# Patient Record
Sex: FEMALE | Race: WHITE | NOT HISPANIC OR LATINO | Employment: UNEMPLOYED | URBAN - METROPOLITAN AREA
[De-identification: names, ages, dates, MRNs, and addresses within clinical notes are randomized per-mention and may not be internally consistent; named-entity substitution may affect disease eponyms.]

---

## 2017-12-07 ENCOUNTER — OFFICE VISIT (OUTPATIENT)
Dept: ORTHOPEDIC SURGERY | Facility: CLINIC | Age: 47
End: 2017-12-07

## 2017-12-07 VITALS
HEIGHT: 65 IN | BODY MASS INDEX: 20.94 KG/M2 | HEART RATE: 72 BPM | DIASTOLIC BLOOD PRESSURE: 75 MMHG | WEIGHT: 125.66 LBS | SYSTOLIC BLOOD PRESSURE: 107 MMHG

## 2017-12-07 DIAGNOSIS — M79.645 THUMB PAIN, LEFT: Primary | ICD-10-CM

## 2017-12-07 PROCEDURE — 99204 OFFICE O/P NEW MOD 45 MIN: CPT | Performed by: PHYSICIAN ASSISTANT

## 2017-12-07 PROCEDURE — 20600 DRAIN/INJ JOINT/BURSA W/O US: CPT | Performed by: PHYSICIAN ASSISTANT

## 2017-12-07 RX ORDER — LIDOCAINE HYDROCHLORIDE 10 MG/ML
1 INJECTION, SOLUTION INFILTRATION; PERINEURAL
Status: COMPLETED | OUTPATIENT
Start: 2017-12-07 | End: 2017-12-07

## 2017-12-07 RX ORDER — TRIAMCINOLONE ACETONIDE 40 MG/ML
20 INJECTION, SUSPENSION INTRA-ARTICULAR; INTRAMUSCULAR
Status: COMPLETED | OUTPATIENT
Start: 2017-12-07 | End: 2017-12-07

## 2017-12-07 RX ADMIN — LIDOCAINE HYDROCHLORIDE 1 ML: 10 INJECTION, SOLUTION INFILTRATION; PERINEURAL at 13:41

## 2017-12-07 RX ADMIN — TRIAMCINOLONE ACETONIDE 20 MG: 40 INJECTION, SUSPENSION INTRA-ARTICULAR; INTRAMUSCULAR at 13:41

## 2017-12-07 NOTE — PROGRESS NOTES
Procedure   Small Joint Arthrocentesis  Consent given by: patient  Site marked: site marked  Timeout: Immediately prior to procedure a time out was called to verify the correct patient, procedure, equipment, support staff and site/side marked as required   Supporting Documentation  Indications: pain   Procedure Details  Location: thumb - L thumb CMC  Preparation: Patient was prepped and draped in the usual sterile fashion  Needle size: 25 G  Approach: dorsal  Medications administered: 20 mg triamcinolone acetonide 40 MG/ML; 1 mL lidocaine 1 %  Patient tolerance: patient tolerated the procedure well with no immediate complications

## 2017-12-07 NOTE — PROGRESS NOTES
"        [facility] Note    Subjective     Patient: Aisha Moran  : 1970    Primary Care Provider: Comfort Shaver MD    Requesting Provider: As above    Pain of the Left Hand      History    Chief Complaint: left thumb pain and thumb, long and ring finger pain.    History of Present Illness: This is a very pleasant RHD 47-year-old female presenting today discuss her two-month history of left thumb pain and long and ring finger tingling and pain.  She denies any trauma or injury.  She complains of pain with any use of the hand.  She rates it to be 10/10.  She has difficulty describing the pain and describes it as \"bad pain\".  She has pain with any activity, worse in the evenings after she is use the hand.  She has been using a splint during the day and taking Tylenol with improved pain.  She denies any tip triggering or mechanical symptoms.  She reports radiating pain into the thumb.    Current Outpatient Prescriptions on File Prior to Visit   Medication Sig Dispense Refill   • albuterol (PROVENTIL HFA;VENTOLIN HFA) 108 (90 BASE) MCG/ACT inhaler Inhale 2 puffs every 4 (four) hours as needed for wheezing or shortness of air. 1 inhaler 0   • clonazePAM (KlonoPIN) 1 MG tablet Take 1 mg by mouth 2 (two) times a day as needed for seizures.     • melatonin 5 MG tablet tablet Take 5 mg by mouth at night as needed.     • [DISCONTINUED] predniSONE (DELTASONE) 20 MG tablet Take 1 tablet by mouth daily. 5 tablet 0     No current facility-administered medications on file prior to visit.       No Known Allergies   Past Medical History:   Diagnosis Date   • Asthma    • Heart disease    • Panic attacks      Past Surgical History:   Procedure Laterality Date   • HYSTERECTOMY      partial   • LUMBAR FUSION       Family History   Problem Relation Age of Onset   • Osteoarthritis Mother    • Diabetes Mother    • Heart attack Father       Social History     Social History   • Marital status:      Spouse " "name: N/A   • Number of children: N/A   • Years of education: N/A     Occupational History   • Not on file.     Social History Main Topics   • Smoking status: Former Smoker     Packs/day: 1.00     Years: 20.00   • Smokeless tobacco: Never Used   • Alcohol use No   • Drug use: No   • Sexual activity: Defer     Other Topics Concern   • Not on file     Social History Narrative        Review of Systems   Constitutional: Positive for activity change.   HENT: Negative.    Eyes: Negative.    Respiratory: Negative.    Cardiovascular: Negative.    Gastrointestinal: Negative.    Endocrine: Negative.    Genitourinary: Negative.    Musculoskeletal: Positive for arthralgias.   Skin: Negative.    Allergic/Immunologic: Negative.    Neurological: Negative.    Hematological: Negative.    Psychiatric/Behavioral: Negative.        The following portions of the patient's history were reviewed and updated as appropriate: allergies, current medications, past family history, past medical history, past social history, past surgical history and problem list.      Objective      Physical Exam  /75  Pulse 72  Ht 166 cm (65.35\")  Wt 57 kg (125 lb 10.6 oz)  BMI 20.69 kg/m2    Body mass index is 20.69 kg/(m^2).    GENERAL: Body habitus: normal weight for height    Gait: normal     Mental Status:  awake and alert; oriented to person, place, and time    Voice:  clear  SKIN:  Normal    Hair Growth:  Right:normal; Left:  normal  HEENT: Head: Normocephalic, atraumatic,  without obvious abnormality.  eye: normal external eye, no icterus   PULM:  Repiratory effort normal    Ortho Exam  Peripheral Vascular   Bilateral Upper Extremity    No cyanotic nail beds    Pink nail beds and rapid capillary refill   Palpation    Radial Pulse - Bilaterally normal    Neurologic   Sensory: Light touch intact- Right and left hand    Left Upper Extremity    Left wrist extensors: 5/5    Left wrist flexors: 5/5    Left intrinsics: 5/5     Musculoskeletal   Left " Elbow    Forearm supination: AROM - 90 degrees    Forearm pronation: AROM - 90 degrees     Inspection and Palpation    Left Wrist    Tenderness - mildly tender over the radial styloid    Swelling - none    Crepitus - none    Muscle tone - no atrophy     ROJM:   Left Wrist    Flexion: AROM - 90 degrees    Extension: AROM - 90 degrees      Deformities, Malalignments, Discrepancies    None     Functional Testing    Left Wrist    Tinel's Sign negative    Phalen's Sign negative    Negative Finkelstein's test       Strength and Tone    Left  strength: good     Hand Exam:     Tender over the thumb CMC joint with mildly positive grind test.  Tender over thumb A1 pulley with no palpable triggering    Full range of motion of the thumb MCPJ and IPJ bialterally    Full range of motion of the index finger MCPJ, PIPJ and DIPJ  bilaterally    Full range of motion of the middle finger MCPJ, PIPJ and DIPJ bilaterally    Full range of motion of the ring finger MCPJ, PIPJ and DIPJ bilaterally    Full range of motion of the small finger MCPJ, PIPJ and DIPJ bialterally    FDS, FDP and extensor tendons are intact in the index, middle, ring and small fingers bilaterally    FPJ and extensor tendons are intact in the thumb.    No palpable triggering      Medical Decision Making    Data Review:   ordered and reviewed x-rays today    Assessment:  1. Thumb pain, left        Plan:  1. Left thumb pain.  I reviewed today's x-rays and clinical findings, past and current treatment with the patient.  On exam, she is tender over the thumb CMC joint with mildly positive grind test,  tender over the A1 pulley..  X-rays show no significant arthritis or anything more worrisome.  I explained to the patient that sometimes thumb pain can be difficult to figure out.  I suspect her pain may be secondary to synovitis in the joint.  She does not have any palpable nodule or triggering in the thumb.  We discussed treatment options including continued use of  the brace, topical anti-inflammatories and steroid injection.  Plan today is steroid injection into the left CMC joints.  I spent her that this can take about a week to kick in.  She will continue using her brace.  We will see her back in one month and if she is not had any pain relief we'll consider injecting the tendon sheath.      Mechelle Dai PA-C  12/07/17  2:12 PM

## 2018-01-11 ENCOUNTER — OFFICE VISIT (OUTPATIENT)
Dept: ORTHOPEDIC SURGERY | Facility: CLINIC | Age: 48
End: 2018-01-11

## 2018-01-11 DIAGNOSIS — M65.312 TRIGGER THUMB OF LEFT HAND: Primary | ICD-10-CM

## 2018-01-11 PROCEDURE — 99213 OFFICE O/P EST LOW 20 MIN: CPT | Performed by: PHYSICIAN ASSISTANT

## 2018-01-11 PROCEDURE — 20550 NJX 1 TENDON SHEATH/LIGAMENT: CPT | Performed by: PHYSICIAN ASSISTANT

## 2018-01-11 RX ORDER — METHYLPREDNISOLONE ACETATE 80 MG/ML
80 INJECTION, SUSPENSION INTRA-ARTICULAR; INTRALESIONAL; INTRAMUSCULAR; SOFT TISSUE
Status: COMPLETED | OUTPATIENT
Start: 2018-01-11 | End: 2018-01-11

## 2018-01-11 RX ORDER — LIDOCAINE HYDROCHLORIDE 10 MG/ML
1 INJECTION, SOLUTION INFILTRATION; PERINEURAL
Status: COMPLETED | OUTPATIENT
Start: 2018-01-11 | End: 2018-01-11

## 2018-01-11 RX ADMIN — LIDOCAINE HYDROCHLORIDE 1 ML: 10 INJECTION, SOLUTION INFILTRATION; PERINEURAL at 13:23

## 2018-01-11 RX ADMIN — METHYLPREDNISOLONE ACETATE 80 MG: 80 INJECTION, SUSPENSION INTRA-ARTICULAR; INTRALESIONAL; INTRAMUSCULAR; SOFT TISSUE at 13:23

## 2018-01-11 NOTE — PROGRESS NOTES
Procedure   Left Trigger Thumb Injection  Consent given by: patient  Site marked: site marked  Timeout: Immediately prior to procedure a time out was called to verify the correct patient, procedure, equipment, support staff and site/side marked as required   Supporting Documentation  Indications: pain   Procedure Details  Location: thumb - Thumb joint: Left Trigger Thumb.  Preparation: Patient was prepped and draped in the usual sterile fashion  Needle size: 22 G  Medications administered: 1 mL lidocaine 1 %; 80 mg methylPREDNISolone acetate 80 MG/ML (1cc Bupivacaine  NDC#95432-511-09  Lot#[id number]  Exp. Date: 09/01/2019)  Patient tolerance: patient tolerated the procedure well with no immediate complications

## 2018-01-11 NOTE — PROGRESS NOTES
[facility]    Subjective     Patient: Aisha Moran  : 1970    Primary Care Provider: Comfort Shaver MD    Requesting Provider: As above    Follow-up of the Left Thumb (1 month follow up)      History    Chief Complaint: Follow-up left thumb pain    History of Present Illness: Patient presents for follow-up left thumb pain after left CMC joint injection.  She reports that the injection improved her pain 80+ percent.  She is now able to use the thumb in most activities without pain.  She now is complaining of catching in the thumb.  Sometimes during the day the thumb gets stuck down and she has to manually extend it with the other hand.  She denies any radiating pain.  She denies any swelling warmth or erythema.  She denies any other digit pain.  She denies any fever, chills or constitutional symptoms    Current Outpatient Prescriptions on File Prior to Visit   Medication Sig Dispense Refill   • albuterol (PROVENTIL HFA;VENTOLIN HFA) 108 (90 BASE) MCG/ACT inhaler Inhale 2 puffs every 4 (four) hours as needed for wheezing or shortness of air. 1 inhaler 0   • clonazePAM (KlonoPIN) 1 MG tablet Take 1 mg by mouth 2 (two) times a day as needed for seizures.     • melatonin 5 MG tablet tablet Take 5 mg by mouth at night as needed.       No current facility-administered medications on file prior to visit.       No Known Allergies   Past Medical History:   Diagnosis Date   • Asthma    • Heart disease    • Panic attacks      Past Surgical History:   Procedure Laterality Date   • HYSTERECTOMY      partial   • LUMBAR FUSION       Family History   Problem Relation Age of Onset   • Osteoarthritis Mother    • Diabetes Mother    • Heart attack Father       Social History     Social History   • Marital status:      Spouse name: N/A   • Number of children: N/A   • Years of education: N/A     Occupational History   • Not on file.     Social History Main Topics   • Smoking status: Former  Smoker     Packs/day: 1.00     Years: 20.00   • Smokeless tobacco: Never Used   • Alcohol use No   • Drug use: No   • Sexual activity: Defer     Other Topics Concern   • Not on file     Social History Narrative        Review of Systems    The following portions of the patient's history were reviewed and updated as appropriate: allergies, current medications, past family history, past medical history, past social history, past surgical history and problem list.      Objective      Physical Exam  There were no vitals taken for this visit.    There is no height or weight on file to calculate BMI.      Ortho Exam  Right hand exam:    Tender over the A1 pulley of the left thumb with palpable nodule    Thumb CMC joint mildly tender    Full range of motion of the thumb MCPJ and IPJ bilaterally    Full range of motion of the index finger MCPJ, PIPJ and DIPJ  bilaterally    Full range of motion of the middle finger MCPJ, PIPJ and DIPJ bilaterally    Full range of motion of the ring finger MCPJ, PIPJ and DIPJ bilaterally    Full range of motion of the small finger MCPJ, PIPJ and DIPJ bilaterally    FDS, FDP and extensor tendons are intact in the index, middle, ring and small fingers bilaterally    FPJ and extensor tendons are intact in the thumb.    No palpable triggering    Medical Decision Making    Data Review:   none    Assessment:  1. Trigger thumb of left hand        Plan:  Left trigger thumb.  I reviewed clinical findings past and current treatment with the patient.  On exam, she is tender over the left thumb A1 pulley with palpable nodule and no palpable triggering.  She reports that the CMC injection improved her pain 80% but she complains of continued catching and triggering of the thumb.  We discussed treatment options including continued splinting and injection into the tendon sheath.  I explained to her that injection can resolve the problem about 30% of the time.  If it returns and continues to affect her  ability to do her normal daily activity, she may want to consider surgical trigger finger release.  Her condition today is steroid injection in the left thumb tendon sheath.  She'll return in 6-8 weeks to see how she has progressed or sooner if needed.    Mechelle Dai PA-C  01/11/18  4:34 PM

## 2023-01-24 ENCOUNTER — APPOINTMENT (OUTPATIENT)
Dept: GENERAL RADIOLOGY | Facility: HOSPITAL | Age: 53
End: 2023-01-24
Payer: COMMERCIAL

## 2023-01-24 ENCOUNTER — APPOINTMENT (OUTPATIENT)
Dept: CT IMAGING | Facility: HOSPITAL | Age: 53
End: 2023-01-24
Payer: COMMERCIAL

## 2023-01-24 ENCOUNTER — HOSPITAL ENCOUNTER (EMERGENCY)
Facility: HOSPITAL | Age: 53
Discharge: HOME | End: 2023-01-24
Attending: EMERGENCY MEDICINE | Admitting: EMERGENCY MEDICINE
Payer: COMMERCIAL

## 2023-01-24 VITALS
BODY MASS INDEX: 17.52 KG/M2 | HEART RATE: 89 BPM | WEIGHT: 109 LBS | SYSTOLIC BLOOD PRESSURE: 100 MMHG | TEMPERATURE: 97.7 F | OXYGEN SATURATION: 99 % | HEIGHT: 66 IN | DIASTOLIC BLOOD PRESSURE: 70 MMHG | RESPIRATION RATE: 18 BRPM

## 2023-01-24 DIAGNOSIS — Z82.49 FAMILY HISTORY OF HEART ATTACK: ICD-10-CM

## 2023-01-24 DIAGNOSIS — R07.89 CHEST PAIN, ATYPICAL: Primary | ICD-10-CM

## 2023-01-24 DIAGNOSIS — E87.6 HYPOKALEMIA: ICD-10-CM

## 2023-01-24 DIAGNOSIS — R07.9 ACUTE CHEST PAIN: ICD-10-CM

## 2023-01-24 DIAGNOSIS — R19.7 DIARRHEA, UNSPECIFIED TYPE: ICD-10-CM

## 2023-01-24 DIAGNOSIS — F17.210 TOBACCO DEPENDENCE DUE TO CIGARETTES: ICD-10-CM

## 2023-01-24 LAB
ALBUMIN SERPL-MCNC: 4 G/DL (ref 3.5–5.2)
ALBUMIN/GLOB SERPL: 1.5 G/DL
ALP SERPL-CCNC: 70 U/L (ref 39–117)
ALT SERPL W P-5'-P-CCNC: 11 U/L (ref 1–33)
ANION GAP SERPL CALCULATED.3IONS-SCNC: 11 MMOL/L (ref 5–15)
AST SERPL-CCNC: 18 U/L (ref 1–32)
BASOPHILS # BLD AUTO: 0.06 10*3/MM3 (ref 0–0.2)
BASOPHILS NFR BLD AUTO: 0.7 % (ref 0–1.5)
BILIRUB SERPL-MCNC: 0.2 MG/DL (ref 0–1.2)
BUN SERPL-MCNC: 12 MG/DL (ref 6–20)
BUN/CREAT SERPL: 15 (ref 7–25)
CALCIUM SPEC-SCNC: 9.3 MG/DL (ref 8.6–10.5)
CHLORIDE SERPL-SCNC: 103 MMOL/L (ref 98–107)
CK SERPL-CCNC: 58 U/L (ref 20–180)
CO2 SERPL-SCNC: 26 MMOL/L (ref 22–29)
CREAT SERPL-MCNC: 0.8 MG/DL (ref 0.57–1)
DEPRECATED RDW RBC AUTO: 43.3 FL (ref 37–54)
EGFRCR SERPLBLD CKD-EPI 2021: 89.3 ML/MIN/1.73
EOSINOPHIL # BLD AUTO: 0.18 10*3/MM3 (ref 0–0.4)
EOSINOPHIL NFR BLD AUTO: 2 % (ref 0.3–6.2)
ERYTHROCYTE [DISTWIDTH] IN BLOOD BY AUTOMATED COUNT: 12.3 % (ref 12.3–15.4)
FLUAV RNA RESP QL NAA+PROBE: NOT DETECTED
FLUBV RNA RESP QL NAA+PROBE: NOT DETECTED
GLOBULIN UR ELPH-MCNC: 2.6 GM/DL
GLUCOSE SERPL-MCNC: 95 MG/DL (ref 65–99)
HCT VFR BLD AUTO: 44.1 % (ref 34–46.6)
HGB BLD-MCNC: 15.2 G/DL (ref 12–15.9)
HOLD SPECIMEN: NORMAL
IMM GRANULOCYTES # BLD AUTO: 0.01 10*3/MM3 (ref 0–0.05)
IMM GRANULOCYTES NFR BLD AUTO: 0.1 % (ref 0–0.5)
LYMPHOCYTES # BLD AUTO: 3.12 10*3/MM3 (ref 0.7–3.1)
LYMPHOCYTES NFR BLD AUTO: 34.8 % (ref 19.6–45.3)
MAGNESIUM SERPL-MCNC: 1.8 MG/DL (ref 1.6–2.6)
MCH RBC QN AUTO: 33 PG (ref 26.6–33)
MCHC RBC AUTO-ENTMCNC: 34.5 G/DL (ref 31.5–35.7)
MCV RBC AUTO: 95.7 FL (ref 79–97)
MONOCYTES # BLD AUTO: 0.72 10*3/MM3 (ref 0.1–0.9)
MONOCYTES NFR BLD AUTO: 8 % (ref 5–12)
NEUTROPHILS NFR BLD AUTO: 4.88 10*3/MM3 (ref 1.7–7)
NEUTROPHILS NFR BLD AUTO: 54.4 % (ref 42.7–76)
NRBC BLD AUTO-RTO: 0 /100 WBC (ref 0–0.2)
NT-PROBNP SERPL-MCNC: 33 PG/ML (ref 0–900)
PLATELET # BLD AUTO: 305 10*3/MM3 (ref 140–450)
PMV BLD AUTO: 10.3 FL (ref 6–12)
POTASSIUM SERPL-SCNC: 2.7 MMOL/L (ref 3.5–5.2)
PROT SERPL-MCNC: 6.6 G/DL (ref 6–8.5)
QT INTERVAL: 368 MS
QTC INTERVAL: 457 MS
RBC # BLD AUTO: 4.61 10*6/MM3 (ref 3.77–5.28)
SARS-COV-2 RNA RESP QL NAA+PROBE: NOT DETECTED
SODIUM SERPL-SCNC: 140 MMOL/L (ref 136–145)
TROPONIN T SERPL-MCNC: <0.01 NG/ML (ref 0–0.03)
WBC NRBC COR # BLD: 8.97 10*3/MM3 (ref 3.4–10.8)
WHOLE BLOOD HOLD COAG: NORMAL
WHOLE BLOOD HOLD SPECIMEN: NORMAL

## 2023-01-24 PROCEDURE — 71275 CT ANGIOGRAPHY CHEST: CPT

## 2023-01-24 PROCEDURE — 99284 EMERGENCY DEPT VISIT MOD MDM: CPT | Performed by: INTERNAL MEDICINE

## 2023-01-24 PROCEDURE — 83735 ASSAY OF MAGNESIUM: CPT | Performed by: NURSE PRACTITIONER

## 2023-01-24 PROCEDURE — 0 POTASSIUM CHLORIDE 10 MEQ/100ML SOLUTION: Performed by: NURSE PRACTITIONER

## 2023-01-24 PROCEDURE — 99284 EMERGENCY DEPT VISIT MOD MDM: CPT

## 2023-01-24 PROCEDURE — 71045 X-RAY EXAM CHEST 1 VIEW: CPT

## 2023-01-24 PROCEDURE — 85025 COMPLETE CBC W/AUTO DIFF WBC: CPT | Performed by: EMERGENCY MEDICINE

## 2023-01-24 PROCEDURE — 36415 COLL VENOUS BLD VENIPUNCTURE: CPT

## 2023-01-24 PROCEDURE — 93005 ELECTROCARDIOGRAM TRACING: CPT | Performed by: EMERGENCY MEDICINE

## 2023-01-24 PROCEDURE — 87636 SARSCOV2 & INF A&B AMP PRB: CPT | Performed by: NURSE PRACTITIONER

## 2023-01-24 PROCEDURE — 96365 THER/PROPH/DIAG IV INF INIT: CPT

## 2023-01-24 PROCEDURE — 0 IOPAMIDOL PER 1 ML: Performed by: EMERGENCY MEDICINE

## 2023-01-24 PROCEDURE — 80053 COMPREHEN METABOLIC PANEL: CPT | Performed by: EMERGENCY MEDICINE

## 2023-01-24 PROCEDURE — 83880 ASSAY OF NATRIURETIC PEPTIDE: CPT | Performed by: EMERGENCY MEDICINE

## 2023-01-24 PROCEDURE — 82550 ASSAY OF CK (CPK): CPT | Performed by: NURSE PRACTITIONER

## 2023-01-24 PROCEDURE — 84484 ASSAY OF TROPONIN QUANT: CPT | Performed by: EMERGENCY MEDICINE

## 2023-01-24 RX ORDER — POTASSIUM CHLORIDE 7.45 MG/ML
10 INJECTION INTRAVENOUS ONCE
Status: COMPLETED | OUTPATIENT
Start: 2023-01-24 | End: 2023-01-24

## 2023-01-24 RX ORDER — POTASSIUM CHLORIDE 750 MG/1
20 CAPSULE, EXTENDED RELEASE ORAL ONCE
Status: COMPLETED | OUTPATIENT
Start: 2023-01-24 | End: 2023-01-24

## 2023-01-24 RX ORDER — SODIUM CHLORIDE 0.9 % (FLUSH) 0.9 %
10 SYRINGE (ML) INJECTION AS NEEDED
Status: DISCONTINUED | OUTPATIENT
Start: 2023-01-24 | End: 2023-01-24 | Stop reason: HOSPADM

## 2023-01-24 RX ORDER — POTASSIUM CHLORIDE 750 MG/1
10 TABLET, FILM COATED, EXTENDED RELEASE ORAL 2 TIMES DAILY
Qty: 6 TABLET | Refills: 0 | Status: SHIPPED | OUTPATIENT
Start: 2023-01-24

## 2023-01-24 RX ADMIN — IOPAMIDOL 75 ML: 755 INJECTION, SOLUTION INTRAVENOUS at 14:25

## 2023-01-24 RX ADMIN — POTASSIUM CHLORIDE 10 MEQ: 7.46 INJECTION, SOLUTION INTRAVENOUS at 14:52

## 2023-01-24 RX ADMIN — POTASSIUM CHLORIDE 20 MEQ: 10 CAPSULE, COATED, EXTENDED RELEASE ORAL at 14:49

## 2023-01-24 NOTE — ED PROVIDER NOTES
Subjective   History of Present Illness  Pleasant patient presents to the ER for chest pain and difficulty breathing off and on for last several weeks worse recently.  She tells me it is exertional.  She tells me she is under a great deal stress and is undergoing a divorce.  She is from [facility] but is living in [place] with her brother.  She tells me her father  at a early age in his 60s of heart disease.  Patient is a smoker and she tells me she has had significant amount of weight loss over the last several months around 50 pounds unintentional.  She is unsure if it is from the stress of her divorce or something else.  She denies any abdominal pain.  Tells me she been told in the past that she has had a dysfunctional left ventricle and was told that she needed an echo.  She tells me this was advised her at  several years ago.  She denies any fever or chills.  She denies any dizziness.  She was sent here from the [facility] by EMS for further evaluation of her chest pain difficulty breathing.        Review of Systems    Past Medical History:   Diagnosis Date   • Asthma    • Heart disease    • Panic attacks        No Known Allergies    Past Surgical History:   Procedure Laterality Date   • HYSTERECTOMY      partial   • LUMBAR FUSION         Family History   Problem Relation Age of Onset   • Osteoarthritis Mother    • Diabetes Mother    • Heart attack Father        Social History     Socioeconomic History   • Marital status: Legally    Tobacco Use   • Smoking status: Former     Packs/day: 1.00     Years: 20.00     Pack years: 20.00     Types: Cigarettes   • Smokeless tobacco: Never   Vaping Use   • Vaping Use: Never used   Substance and Sexual Activity   • Alcohol use: No   • Drug use: No   • Sexual activity: Defer           Objective   Physical Exam  Constitutional:       Appearance: She is well-developed.   HENT:      Head: Normocephalic and atraumatic.      Right Ear: External ear normal.       Left Ear: External ear normal.      Nose: Nose normal.   Eyes:      Conjunctiva/sclera: Conjunctivae normal.      Pupils: Pupils are equal, round, and reactive to light.   Cardiovascular:      Rate and Rhythm: Normal rate and regular rhythm.      Heart sounds: Normal heart sounds.   Pulmonary:      Effort: Pulmonary effort is normal.      Breath sounds: Normal breath sounds.   Abdominal:      General: Bowel sounds are normal.      Palpations: Abdomen is soft.   Musculoskeletal:         General: Normal range of motion.      Cervical back: Normal range of motion and neck supple.   Skin:     General: Skin is warm and dry.   Neurological:      Mental Status: She is alert and oriented to person, place, and time.   Psychiatric:         Behavior: Behavior normal.         Thought Content: Thought content normal.         Judgment: Judgment normal.         Procedures           ED Course  ED Course as of 01/24/23 1600   Tue Jan 24, 2023   1353 Cards Consulted via Donya [DAYAMI]   1424 Broad differential including pulmonary embolism arrhythmia.  Coronary artery disease.  Her potassium is low we are replacing that.  We are awaiting cardiology evaluation and the results of the CTA.  We will also need to get a second troponin EKG for further evaluation. [JM]   1438 Awaiting CT results. [JM]   1525 I spoke with cardiology.  They plan on ordering a stress test and they are putting that in nail.  They do not think a second set is necessary.  Patient advised him that she had been having diarrhea for quite some time.  Did not appreciate any abdominal pain during my exam.  I think it is reasonable to give her a GI referral as well as stool studies.  She can also take over-the-counter potassium as well [DAYAMI]      ED Course User Index  [DAYAMI] Patel Chirinos APRN                 CT Angiogram Chest   Final Result   Impression:   No evidence of pulmonary embolic disease. No evidence of pneumonia or other active chest pathology      Electronically  Signed: Pee Rahman     1/24/2023 3:04 PM EST     Workstation ID: [id number]      XR Chest 1 View   Final Result   Impression:   No acute cardiopulmonary disease.      Electronically Signed: Brodie Espinoza     1/24/2023 12:59 PM EST     Workstation ID: [id number]                                     Medical Decision Making  Pneumonia.  Costochondritis.  Pleurisy.  Pulmonary embolism.    Acute chest pain: acute illness or injury     Details: Reproducible  Diarrhea, unspecified type: acute illness or injury  Hypokalemia: acute illness or injury     Details: Diarrhea.  Oral and IV potassium ordered.  Amount and/or Complexity of Data Reviewed  External Data Reviewed: labs, radiology, ECG and notes.  Labs: ordered. Decision-making details documented in ED Course.  Radiology: ordered. Decision-making details documented in ED Course.  ECG/medicine tests: ordered. Decision-making details documented in ED Course.  Discussion of management or test interpretation with external provider(s): Spoke with Dr. Tran in person.  They plan on ordering a stress test.  Doubt cardiac in nature    Risk  Prescription drug management.          Final diagnoses:   Acute chest pain   Diarrhea, unspecified type   Hypokalemia       ED Disposition  ED Disposition     ED Disposition   Discharge    Condition   Stable    Comment   --             Comfort Shaver MD  [address] [place] [address]  [phone]    Schedule an appointment as soon as possible for a visit       [facility] Emergency Department  [address] [address] [phone]    As needed    [facility] - [place] [id number]  [phone]  Schedule an appointment as soon as possible for a visit            Medication List      New Prescriptions    potassium chloride 10 MEQ CR tablet  Take 1 tablet by mouth 2 (Two) Times a Day.           Where to Get Your Medications      These medications were sent to [facility] - [place], [place] - [address] - [phone] PH - [phone] FX  [address], [facility] [address]    Phone: [phone]   · potassium chloride 10 MEQ CR tablet          Patel Chirinos APRN  01/24/23 1533       Patel Chirinos APRN  01/24/23 1534       Patel Chirinos APRN  01/24/23 1600

## 2023-01-24 NOTE — CASE MANAGEMENT/SOCIAL WORK
Continued Stay Note  [facility]     Patient Name: Aisha Moran  MRN: [id number]  Today's Date: 1/24/2023    Admit Date: 1/24/2023    Plan: Transport   Discharge Plan     Row Name 01/24/23 1637       Plan    Plan Transport    Plan Comments MSW contacted by RN requesting transportation assistance. MSW scheduled Lyft Ride and reported details to Pt. RN updated. MSW available.    Final Discharge Disposition Code 01 - home or self-care               Discharge Codes    No documentation.                     JOHANNA Rizzo

## 2023-01-24 NOTE — CONSULTS
"[facility] at [facility]  CARDIOLOGY CONSULTATION NOTE    Aisha Moran  : 1970  MRN:[id number]  Home Phone:[phone]    Date of Admission:2023  Date of Consultation: 23   Primary Provider:  Comfort Shvaer MD    Referring Provider: No ref. provider found  Reason for Consultation: chest pain    IDENTIFICATION: A 53 y.o. female from [place], [place] but living with her brother in [place]. Going through divorce    CC:   Chief Complaint   Patient presents with   • Shortness of Breath       PROBLEM LIST:   1. Atypical chest pain  1. Regadenoson Stress 2016 at : no evidence of ischemia, mildly depressed resting global function with hypokinesis of the mid to distal anterior and lateral myocardium, regions of hypokinesis at est significantly improved with stress, no CMR evidence of LV scar, normal RV size and fx  2. Asthma  3. Anxiety/panic attacks  4. Hypokalemia, likely due to chronic diarrhea    ALLERGIES: No Known Allergies    HPI: Ms. Moran is a 53-year-old female with minimal cardiac history who presented to [facility] ED with chest pain.  In 2016 she had an MR cardiac stress with and without IV contrast performed at  with slightly abnormal results reported above including several hypokinetic LV wall segments at rest that improved with stress with no signs of LV scar.  She has been having sharp central chest pain intermittently for about 10 days that sometimes \"takes her breath away,\" but otherwise has no associated symptoms.  It does not radiate.  Patient states that he does not feel exertional, but that she thinks it is related to stress.  She is currently in the middle of a divorce.  She reports having chronic diarrhea for at least 2 months with 3+ loose stools a day. She has had unintentional weight loss of 50 pounds over the last 8 months that she attributes to decreased p.o. intake due to stress from her divorce as well as frequent diarrhea.  She denies palpitations, " dizziness, lower extremity swelling or syncope.  Initial troponin was negative.  EKG with sinus mechanism and no signs of acute ischemia.  Chest x-ray and CTA chest with no acute cardiopulmonary abnormality.      ROS: All systems have been reviewed and are negative with the exception of those mentioned in the HPI and problem list above.    HOME MEDICINES:   Current Outpatient Medications   Medication Instructions   • albuterol (PROVENTIL HFA;VENTOLIN HFA) 108 (90 BASE) MCG/ACT inhaler 2 puffs, Inhalation, Every 4 Hours PRN   • buPROPion XL (WELLBUTRIN XL) 150 MG 24 hr tablet No dose, route, or frequency recorded.   • cetirizine (zyrTEC) 10 MG tablet No dose, route, or frequency recorded.   • Cholecalciferol (Vitamin D) 50 MCG (2000 UT) tablet No dose, route, or frequency recorded.   • clonazePAM (KLONOPIN) 1 mg, Oral, 2 Times Daily PRN   • cyclobenzaprine (FLEXERIL) 10 MG tablet No dose, route, or frequency recorded.   • fluticasone (FLONASE) 50 MCG/ACT nasal spray No dose, route, or frequency recorded.   • folic acid (FOLVITE) 1 MG tablet No dose, route, or frequency recorded.   • melatonin 5 mg, Oral, Nightly PRN   • montelukast (SINGULAIR) 10 MG tablet No dose, route, or frequency recorded.   • nicotine (NICODERM CQ) 21 MG/24HR patch 1 patch, Transdermal   • omeprazole (priLOSEC) 40 MG capsule No dose, route, or frequency recorded.   • sertraline (ZOLOFT) 100 MG tablet No dose, route, or frequency recorded.   • thiamine (VITAMIN B1) 100 mg, Oral, Daily   • traZODone (DESYREL) 100 MG tablet No dose, route, or frequency recorded.   • vitamin B-12 (CYANOCOBALAMIN) 1000 MCG tablet No dose, route, or frequency recorded.       Surgical History:   Past Surgical History:   Procedure Laterality Date   • HYSTERECTOMY      partial   • LUMBAR FUSION         Social History:   Social History     Socioeconomic History   • Marital status: Legally    Tobacco Use   • Smoking status: Former     Packs/day: 1.00     Years:  "20.00     Pack years: 20.00     Types: Cigarettes   • Smokeless tobacco: Never   Vaping Use   • Vaping Use: Never used   Substance and Sexual Activity   • Alcohol use: No   • Drug use: No   • Sexual activity: Defer       Family History:   Family History   Problem Relation Age of Onset   • Osteoarthritis Mother    • Diabetes Mother    • Heart attack Father        Objective     /87   Pulse 96   Temp 97.7 °F (36.5 °C) (Oral)   Resp 18   Ht 167.6 cm (66\")   Wt 49.4 kg (109 lb)   SpO2 99%   BMI 17.59 kg/m²   No intake or output data in the 24 hours ending 01/24/23 1419    PHYSICAL EXAM:  CONSTITUTIONAL: frail, cooperative, in no acute distress  HEENT: Normocephalic, atraumatic, no JVD  CARDIOVASCULAR:  Regular rhythm and normal rate, no murmur, gallop, rub.  Tenderness to palpation at sternum  RESPIRATORY: Clear to auscultation, normal respiratory effort, no wheezing, rales or rhonchi  GI: Soft, nontender  EXTREMITIES: No gross deformities, no edema.  Peripheral pulses are present and equal bilaterally  SKIN: Warm, dry. No bleeding, bruising or rash  NEUROLOGICAL: No gross focal deficits  PSYCHIATRIC: Normal mood and affect. Behavior is normal     Labs/Diagnostic Data  Results from last 7 days   Lab Units 01/24/23  1300   SODIUM mmol/L 140   POTASSIUM mmol/L 2.7*   CHLORIDE mmol/L 103   CO2 mmol/L 26.0   BUN mg/dL 12   CREATININE mg/dL 0.80   GLUCOSE mg/dL 95   CALCIUM mg/dL 9.3     Results from last 7 days   Lab Units 01/24/23  1300   CK TOTAL U/L 58   TROPONIN T ng/mL <0.010     Results from last 7 days   Lab Units 01/24/23  1300   WBC 10*3/mm3 8.97   HEMOGLOBIN g/dL 15.2   HEMATOCRIT % 44.1   PLATELETS 10*3/mm3 305     Results from last 7 days   Lab Units 01/24/23  1300   MAGNESIUM mg/dL 1.8                 Results from last 7 days   Lab Units 01/24/23  1300   PROBNP pg/mL 33.0           EKG: NSR with nonspecific ST changes    Radiology Data:   CXR 1/24/23:  No acute cardiopulmonary abnormality    Current " Medications:    potassium chloride, 20 mEq, Oral, Once  potassium chloride, 10 mEq, Intravenous, Once           Assessment and Plan  1. Atypical chest pain  a. No chest pain currently  b. Initial troponin negative  c. EKG normal sinus rhythm with nonspecific changes and no signs of acute ischemia  d. CXR and CTA chest with no signs of acute cardiopulmonary abnormality  e. Abnormal MRI stress test 2016   f. Positive RF: 30+ pack year smoking history, family history CAD  g. Outpatient stress test ordered  2. Hypokalemia/Unintentional Weight Loss/Diarrhea  a. Recommend ambulatory GI referral      Plan:    Outpatient Cardiolite GXT as the patient's pain seems to be related to stress and is not related to exertional activity.  Outpatient evaluation by GI for weight loss and diarrhea as well as her hypokalemia.    Scribed by Luis E Hassan PA-C for Dr. France Tran MD on 01/24/23     I France Tran MD personally performed the services described in this documentation as scribed by the above individual in my presence, and it is both accurate and complete.    France Tran MD, FACC

## 2023-01-24 NOTE — DISCHARGE INSTRUCTIONS
Please call ASAP to arrange outpatient follow up with one of the following gastroenterologists:    [facility] Provider Dr. Thiago Fountain, Dr. MYNOR Osullivan, Dr. Jonah Macedo, LILIA Barron    Office Location:  [address] [place] [address]    Office # [phone]    Or    [facility] GI Provider Dr. Alex Thakur and Dr. Mahesh Montana    Office Location:   [address] [place] [address]    Office # [phone]